# Patient Record
Sex: MALE | Race: WHITE | NOT HISPANIC OR LATINO
[De-identification: names, ages, dates, MRNs, and addresses within clinical notes are randomized per-mention and may not be internally consistent; named-entity substitution may affect disease eponyms.]

---

## 2018-05-09 ENCOUNTER — TRANSCRIPTION ENCOUNTER (OUTPATIENT)
Age: 33
End: 2018-05-09

## 2019-07-21 ENCOUNTER — TRANSCRIPTION ENCOUNTER (OUTPATIENT)
Age: 34
End: 2019-07-21

## 2020-01-30 ENCOUNTER — TRANSCRIPTION ENCOUNTER (OUTPATIENT)
Age: 35
End: 2020-01-30

## 2020-03-12 ENCOUNTER — LABORATORY RESULT (OUTPATIENT)
Age: 35
End: 2020-03-12

## 2020-03-12 ENCOUNTER — APPOINTMENT (OUTPATIENT)
Dept: OTOLARYNGOLOGY | Facility: CLINIC | Age: 35
End: 2020-03-12
Payer: COMMERCIAL

## 2020-03-12 VITALS
HEIGHT: 73 IN | SYSTOLIC BLOOD PRESSURE: 131 MMHG | WEIGHT: 210 LBS | DIASTOLIC BLOOD PRESSURE: 83 MMHG | BODY MASS INDEX: 27.83 KG/M2 | HEART RATE: 57 BPM

## 2020-03-12 PROCEDURE — 10005 FNA BX W/US GDN 1ST LES: CPT

## 2020-03-12 PROCEDURE — 99204 OFFICE O/P NEW MOD 45 MIN: CPT | Mod: 25

## 2020-03-12 RX ORDER — METHYLPREDNISOLONE 4 MG/1
4 TABLET ORAL
Qty: 21 | Refills: 0 | Status: COMPLETED | COMMUNITY
Start: 2020-01-30

## 2020-03-12 RX ORDER — AMOXICILLIN 400 MG/5ML
400 FOR SUSPENSION ORAL
Qty: 200 | Refills: 0 | Status: COMPLETED | COMMUNITY
Start: 2020-01-30

## 2020-03-12 NOTE — HISTORY OF PRESENT ILLNESS
[de-identified] : Patient referred by Dr. De La Paz for parotid  mass. ct of the neck showed 1.3 cm soft tissue right parotid mass.\par First noticed  7  months ago and slowly increase in size. no pain or discomfort, or discharge. \par Denies  dysphagia, odynophagia, dyspnea, dysphonia, nasal obstruction/bleeding, fever, weakness or weight loss.\par \par

## 2020-03-12 NOTE — PHYSICAL EXAM
[Midline] : trachea located in midline position [Normal] : no rashes [de-identified] : 1.5 cm R parotid tail mass.

## 2020-03-12 NOTE — CONSULT LETTER
[Dear  ___] : Dear  [unfilled], [Consult Letter:] : I had the pleasure of evaluating your patient, [unfilled]. [Please see my note below.] : Please see my note below. [Consult Closing:] : Thank you very much for allowing me to participate in the care of this patient.  If you have any questions, please do not hesitate to contact me. [Sincerely,] : Sincerely, [FreeTextEntry2] : Dr Leon De La Paz [FreeTextEntry3] : \par Domingo Farrell MD, FACS\par \par Otolaryngology-Head and Neck Surgery\par [employer] at [facility]\par

## 2020-07-20 DIAGNOSIS — Z01.818 ENCOUNTER FOR OTHER PREPROCEDURAL EXAMINATION: ICD-10-CM

## 2020-07-21 ENCOUNTER — OUTPATIENT (OUTPATIENT)
Dept: OUTPATIENT SERVICES | Facility: HOSPITAL | Age: 35
LOS: 1 days | End: 2020-07-21

## 2020-07-21 VITALS
HEIGHT: 72 IN | HEART RATE: 68 BPM | WEIGHT: 210.98 LBS | OXYGEN SATURATION: 98 % | DIASTOLIC BLOOD PRESSURE: 78 MMHG | RESPIRATION RATE: 16 BRPM | SYSTOLIC BLOOD PRESSURE: 122 MMHG | TEMPERATURE: 98 F

## 2020-07-21 DIAGNOSIS — Z98.890 OTHER SPECIFIED POSTPROCEDURAL STATES: Chronic | ICD-10-CM

## 2020-07-21 DIAGNOSIS — K11.8 OTHER DISEASES OF SALIVARY GLANDS: ICD-10-CM

## 2020-07-21 LAB
BLD GP AB SCN SERPL QL: NEGATIVE — SIGNIFICANT CHANGE UP
HCT VFR BLD CALC: 44.9 % — SIGNIFICANT CHANGE UP (ref 39–50)
HGB BLD-MCNC: 15.3 G/DL — SIGNIFICANT CHANGE UP (ref 13–17)
MCHC RBC-ENTMCNC: 30 PG — SIGNIFICANT CHANGE UP (ref 27–34)
MCHC RBC-ENTMCNC: 34.1 % — SIGNIFICANT CHANGE UP (ref 32–36)
MCV RBC AUTO: 88 FL — SIGNIFICANT CHANGE UP (ref 80–100)
NRBC # FLD: 0 K/UL — SIGNIFICANT CHANGE UP (ref 0–0)
PLATELET # BLD AUTO: 223 K/UL — SIGNIFICANT CHANGE UP (ref 150–400)
PMV BLD: 11.2 FL — SIGNIFICANT CHANGE UP (ref 7–13)
RBC # BLD: 5.1 M/UL — SIGNIFICANT CHANGE UP (ref 4.2–5.8)
RBC # FLD: 11.9 % — SIGNIFICANT CHANGE UP (ref 10.3–14.5)
RH IG SCN BLD-IMP: POSITIVE — SIGNIFICANT CHANGE UP
WBC # BLD: 6.51 K/UL — SIGNIFICANT CHANGE UP (ref 3.8–10.5)
WBC # FLD AUTO: 6.51 K/UL — SIGNIFICANT CHANGE UP (ref 3.8–10.5)

## 2020-07-21 RX ORDER — SODIUM CHLORIDE 9 MG/ML
1000 INJECTION, SOLUTION INTRAVENOUS
Refills: 0 | Status: DISCONTINUED | OUTPATIENT
Start: 2020-07-25 | End: 2020-08-09

## 2020-07-21 NOTE — H&P PST ADULT - NSICDXPROBLEM_GEN_ALL_CORE_FT
PROBLEM DIAGNOSES  Problem: Other diseases of salivary glands  Assessment and Plan: preop for right parotidectomy possible sternicleidomastoid on 7/25/20  preop instructions given, pt verbalized understanding   GI and chlorhexidine wash provided   pt scheduled for COVID  testing on 7/22/20

## 2020-07-21 NOTE — H&P PST ADULT - NSICDXPASTSURGICALHX_GEN_ALL_CORE_FT
PAST SURGICAL HISTORY:  History of esophageal dilatation x5    S/P reconstruction procedure 3 yrs old- reconstructive nose surgery    Status post repair of glenoid labrum right, 2008 PAST SURGICAL HISTORY:  History of esophageal dilatation x5    S/P reconstruction procedure @ 3 yrs old- reconstructive nose surgery    Status post repair of glenoid labrum right, 2008

## 2020-07-21 NOTE — H&P PST ADULT - NSANTHOSAYNRD_GEN_A_CORE
No. MAIN screening performed.  STOP BANG Legend: 0-2 = LOW Risk; 3-4 = INTERMEDIATE Risk; 5-8 = HIGH Risk

## 2020-07-21 NOTE — H&P PST ADULT - NSICDXPASTMEDICALHX_GEN_ALL_CORE_FT
PAST MEDICAL HISTORY:  Eosinophilic esophagitis     Esophageal stricture     Exercise-induced asthma     Migraine headache

## 2020-07-21 NOTE — H&P PST ADULT - RS GEN PE MLT RESP DETAILS PC
no wheezes/clear to auscultation bilaterally/respirations non-labored/no chest wall tenderness/airway patent/breath sounds equal/good air movement

## 2020-07-21 NOTE — H&P PST ADULT - HISTORY OF PRESENT ILLNESS
34 y/o male presents to PST preop for right parotidectomy possible sternocleidomastoid on 7/25/20. preop dx of other disease of salivary glands. pt reports he noticed right neck mass 1 year ago. CT scan of the neck revealed right parotid mass. pt reports mass has increased in size but does not cause pain.

## 2020-07-22 ENCOUNTER — APPOINTMENT (OUTPATIENT)
Dept: DISASTER EMERGENCY | Facility: CLINIC | Age: 35
End: 2020-07-22

## 2020-07-23 ENCOUNTER — TRANSCRIPTION ENCOUNTER (OUTPATIENT)
Age: 35
End: 2020-07-23

## 2020-07-23 LAB — SARS-COV-2 N GENE NPH QL NAA+PROBE: NOT DETECTED

## 2020-07-24 ENCOUNTER — TRANSCRIPTION ENCOUNTER (OUTPATIENT)
Age: 35
End: 2020-07-24

## 2020-07-24 NOTE — ASU PATIENT PROFILE, ADULT - HEALTHCARE INFORMATION NEEDED, PROFILE
none

## 2020-07-24 NOTE — ASU PATIENT PROFILE, ADULT - PSH
History of esophageal dilatation  x5  S/P reconstruction procedure  @ 3 yrs old- reconstructive nose surgery  Status post repair of glenoid labrum  right, 2008

## 2020-07-24 NOTE — ASU PATIENT PROFILE, ADULT - ABILITY TO HEAR (WITH HEARING AID OR HEARING APPLIANCE IF NORMALLY USED):
Rendering Text In Billing: The slide was read, and reported in an attached document. Adequate: hears normal conversation without difficulty

## 2020-07-25 ENCOUNTER — RESULT REVIEW (OUTPATIENT)
Age: 35
End: 2020-07-25

## 2020-07-25 ENCOUNTER — OUTPATIENT (OUTPATIENT)
Dept: OUTPATIENT SERVICES | Facility: HOSPITAL | Age: 35
LOS: 1 days | Discharge: HOME | End: 2020-07-25
Payer: COMMERCIAL

## 2020-07-25 ENCOUNTER — APPOINTMENT (OUTPATIENT)
Dept: OTOLARYNGOLOGY | Facility: HOSPITAL | Age: 35
End: 2020-07-25

## 2020-07-25 VITALS
DIASTOLIC BLOOD PRESSURE: 67 MMHG | TEMPERATURE: 102 F | HEART RATE: 59 BPM | OXYGEN SATURATION: 93 % | SYSTOLIC BLOOD PRESSURE: 112 MMHG | RESPIRATION RATE: 15 BRPM

## 2020-07-25 VITALS
RESPIRATION RATE: 19 BRPM | SYSTOLIC BLOOD PRESSURE: 127 MMHG | OXYGEN SATURATION: 98 % | TEMPERATURE: 98 F | HEART RATE: 50 BPM | DIASTOLIC BLOOD PRESSURE: 77 MMHG | HEIGHT: 72 IN | WEIGHT: 210.98 LBS

## 2020-07-25 DIAGNOSIS — K11.8 OTHER DISEASES OF SALIVARY GLANDS: ICD-10-CM

## 2020-07-25 DIAGNOSIS — Z98.890 OTHER SPECIFIED POSTPROCEDURAL STATES: Chronic | ICD-10-CM

## 2020-07-25 LAB — RH IG SCN BLD-IMP: POSITIVE — SIGNIFICANT CHANGE UP

## 2020-07-25 PROCEDURE — 88312 SPECIAL STAINS GROUP 1: CPT | Mod: 26

## 2020-07-25 PROCEDURE — 88307 TISSUE EXAM BY PATHOLOGIST: CPT | Mod: 26

## 2020-07-25 PROCEDURE — 15733 MUSC MYOQ/FSCQ FLP H&N PEDCL: CPT

## 2020-07-25 PROCEDURE — 88305 TISSUE EXAM BY PATHOLOGIST: CPT | Mod: 26

## 2020-07-25 PROCEDURE — 42415 EXCISE PAROTID GLAND/LESION: CPT

## 2020-07-25 RX ORDER — ONDANSETRON 8 MG/1
4 TABLET, FILM COATED ORAL ONCE
Refills: 0 | Status: COMPLETED | OUTPATIENT
Start: 2020-07-25 | End: 2020-07-25

## 2020-07-25 RX ORDER — OXYCODONE AND ACETAMINOPHEN 5; 325 MG/1; MG/1
2 TABLET ORAL EVERY 6 HOURS
Refills: 0 | Status: DISCONTINUED | OUTPATIENT
Start: 2020-07-25 | End: 2020-07-25

## 2020-07-25 RX ORDER — ASCORBIC ACID 60 MG
1 TABLET,CHEWABLE ORAL
Qty: 0 | Refills: 0 | DISCHARGE

## 2020-07-25 RX ORDER — OXYCODONE HYDROCHLORIDE 5 MG/1
10 TABLET ORAL ONCE
Refills: 0 | Status: DISCONTINUED | OUTPATIENT
Start: 2020-07-25 | End: 2020-07-25

## 2020-07-25 RX ORDER — OXYCODONE AND ACETAMINOPHEN 5; 325 MG/1; MG/1
1 TABLET ORAL
Qty: 0 | Refills: 0 | DISCHARGE
Start: 2020-07-25

## 2020-07-25 RX ORDER — OXYCODONE AND ACETAMINOPHEN 5; 325 MG/1; MG/1
1 TABLET ORAL EVERY 4 HOURS
Refills: 0 | Status: DISCONTINUED | OUTPATIENT
Start: 2020-07-25 | End: 2020-07-25

## 2020-07-25 RX ORDER — HYDROMORPHONE HYDROCHLORIDE 2 MG/ML
0.5 INJECTION INTRAMUSCULAR; INTRAVENOUS; SUBCUTANEOUS
Refills: 0 | Status: DISCONTINUED | OUTPATIENT
Start: 2020-07-25 | End: 2020-07-25

## 2020-07-25 RX ORDER — CHOLECALCIFEROL (VITAMIN D3) 125 MCG
1 CAPSULE ORAL
Qty: 0 | Refills: 0 | DISCHARGE

## 2020-07-25 RX ORDER — ACETAMINOPHEN 500 MG
650 TABLET ORAL EVERY 4 HOURS
Refills: 0 | Status: DISCONTINUED | OUTPATIENT
Start: 2020-07-25 | End: 2020-08-09

## 2020-07-25 RX ADMIN — ONDANSETRON 4 MILLIGRAM(S): 8 TABLET, FILM COATED ORAL at 23:55

## 2020-07-25 RX ADMIN — OXYCODONE HYDROCHLORIDE 10 MILLIGRAM(S): 5 TABLET ORAL at 21:36

## 2020-07-25 RX ADMIN — HYDROMORPHONE HYDROCHLORIDE 0.5 MILLIGRAM(S): 2 INJECTION INTRAMUSCULAR; INTRAVENOUS; SUBCUTANEOUS at 19:55

## 2020-07-25 RX ADMIN — SODIUM CHLORIDE 30 MILLILITER(S): 9 INJECTION, SOLUTION INTRAVENOUS at 19:53

## 2020-07-25 RX ADMIN — HYDROMORPHONE HYDROCHLORIDE 0.5 MILLIGRAM(S): 2 INJECTION INTRAMUSCULAR; INTRAVENOUS; SUBCUTANEOUS at 21:01

## 2020-07-25 RX ADMIN — HYDROMORPHONE HYDROCHLORIDE 0.5 MILLIGRAM(S): 2 INJECTION INTRAMUSCULAR; INTRAVENOUS; SUBCUTANEOUS at 19:38

## 2020-07-25 RX ADMIN — HYDROMORPHONE HYDROCHLORIDE 0.5 MILLIGRAM(S): 2 INJECTION INTRAMUSCULAR; INTRAVENOUS; SUBCUTANEOUS at 20:48

## 2020-07-25 NOTE — BRIEF OPERATIVE NOTE - OPERATION/FINDINGS
Superficial parotid gland was removed on the right side with the pleomorphic adenoma attached inferiorly. Facial nerve and branches were dissected and stimulated with strong reaction at the end of the case.

## 2020-07-25 NOTE — ASU PREOP CHECKLIST - 5.
Report from Chelly VILLALOBOS ASU Report from Chelly VILLALOBOS ASU - report back to Chelly /Melonie VILLALOBOS

## 2020-07-25 NOTE — BRIEF OPERATIVE NOTE - NSICDXBRIEFPROCEDURE_GEN_ALL_CORE_FT
PROCEDURES:  Creation, flap, sternocleidomastoid 25-Jul-2020 19:42:28  Santiago Webb  Parotidectomy, superficial 25-Jul-2020 19:42:08  Santiago Webb

## 2020-07-25 NOTE — ASU DISCHARGE PLAN (ADULT/PEDIATRIC) - CARE PROVIDER_API CALL
Domingo Farrell  OTOLARYNGOLOGY  [id number] [place] [address]  Phone: [phone]  Fax: [phone]  Follow Up Time:

## 2020-07-25 NOTE — ASU DISCHARGE PLAN (ADULT/PEDIATRIC) - ASU DC SPECIAL INSTRUCTIONSFT
General Discharge Instructions:  Please resume all regular home medications unless specifically advised not to take a particular medication. Also, please take any new medications as prescribed.  Please get plenty of rest, continue to ambulate several times per day, and drink adequate amounts of fluids. Avoid lifting weights greater than 5-10 lbs until you follow-up with your surgeon, who will instruct you further regarding activity restrictions.  Avoid driving or operating heavy machinery while taking pain medications.  Please follow-up with your surgeon and Primary Care Provider (PCP) as advised.  Incision Care:  *Please call your doctor or nurse practitioner if you have increased pain, swelling, redness, or drainage from the incision site.  *Avoid swimming and baths until your follow-up appointment.  *You may shower, and wash surgical incisions with a mild soap and warm water. Gently pat the area dry.  *If you have steri-strips, they will fall off on their own. Please remove any remaining strips 7-10 days after surgery.    Warning Signs:  Please call your doctor or nurse practitioner if you experience the following:  *New or worsening cough, shortness of breath, or wheeze.  *If you are vomiting and cannot keep down fluids or your medications.  *You are getting dehydrated due to continued vomiting, diarrhea, or other reasons. Signs of dehydration include dry mouth, rapid heartbeat, or feeling dizzy or faint when standing.  *Your pain is not improving within 8-12 hours or is not gone within 24 hours.  *You have shaking chills, or fever greater than 101.5 degrees Fahrenheit or 38 degrees Celsius.  *Any change in your symptoms, or any new symptoms that concern you.

## 2020-07-25 NOTE — ASU DISCHARGE PLAN (ADULT/PEDIATRIC) - CALL YOUR DOCTOR IF YOU HAVE ANY OF THE FOLLOWING:
Bleeding that does not stop/Inability to tolerate liquids or foods/Nausea and vomiting that does not stop/Fever greater than (need to indicate Fahrenheit or Celsius)/Pain not relieved by Medications

## 2020-07-27 PROBLEM — K22.2 ESOPHAGEAL OBSTRUCTION: Chronic | Status: ACTIVE | Noted: 2020-07-21

## 2020-07-27 PROBLEM — K20.0 EOSINOPHILIC ESOPHAGITIS: Chronic | Status: ACTIVE | Noted: 2020-07-21

## 2020-07-27 PROBLEM — J45.990 EXERCISE INDUCED BRONCHOSPASM: Chronic | Status: ACTIVE | Noted: 2020-07-21

## 2020-07-27 PROBLEM — G43.909 MIGRAINE, UNSPECIFIED, NOT INTRACTABLE, WITHOUT STATUS MIGRAINOSUS: Chronic | Status: ACTIVE | Noted: 2020-07-21

## 2020-07-29 ENCOUNTER — APPOINTMENT (OUTPATIENT)
Dept: OTOLARYNGOLOGY | Facility: CLINIC | Age: 35
End: 2020-07-29
Payer: COMMERCIAL

## 2020-07-29 PROCEDURE — 99024 POSTOP FOLLOW-UP VISIT: CPT

## 2020-07-29 NOTE — HISTORY OF PRESENT ILLNESS
[de-identified] : pt is post right parotidectomy on 7/25/2020 and here for drain removed.  pt has mild pain of the right ear and some numbness around the incision site. no fever, dyspnea, pt has hx of mild dysphagia with stricture of his esophagus.

## 2020-08-05 ENCOUNTER — TRANSCRIPTION ENCOUNTER (OUTPATIENT)
Age: 35
End: 2020-08-05

## 2020-08-14 ENCOUNTER — APPOINTMENT (OUTPATIENT)
Dept: OTOLARYNGOLOGY | Facility: CLINIC | Age: 35
End: 2020-08-14
Payer: COMMERCIAL

## 2020-08-14 VITALS
SYSTOLIC BLOOD PRESSURE: 144 MMHG | BODY MASS INDEX: 27.05 KG/M2 | HEART RATE: 80 BPM | DIASTOLIC BLOOD PRESSURE: 75 MMHG | WEIGHT: 205 LBS

## 2020-08-14 PROCEDURE — 99024 POSTOP FOLLOW-UP VISIT: CPT

## 2020-08-14 NOTE — HISTORY OF PRESENT ILLNESS
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 Healing well. Minimal pain. Some R neck numbness.

## 2020-08-14 NOTE — PHYSICAL EXAM
[de-identified] : Incision C/D/I [Midline] : trachea located in midline position [Normal] : cranial nerves 2-12 intact

## 2020-08-14 NOTE — CONSULT LETTER
[Dear  ___] : Dear  [unfilled], [Please see my note below.] : Please see my note below. [Courtesy Letter:] : I had the pleasure of seeing your patient, [unfilled], in my office today. [Consult Closing:] : Thank you very much for allowing me to participate in the care of this patient.  If you have any questions, please do not hesitate to contact me. [FreeTextEntry2] : Dr Leon De La Paz  [Sincerely,] : Sincerely, [FreeTextEntry3] : \par Domingo Farrell MD, FACS\par \par Otolaryngology-Head and Neck Surgery\par [employer] at [facility]\par

## 2021-02-26 ENCOUNTER — APPOINTMENT (OUTPATIENT)
Dept: OTOLARYNGOLOGY | Facility: CLINIC | Age: 36
End: 2021-02-26
Payer: COMMERCIAL

## 2021-02-26 VITALS — DIASTOLIC BLOOD PRESSURE: 73 MMHG | SYSTOLIC BLOOD PRESSURE: 132 MMHG | HEART RATE: 55 BPM

## 2021-02-26 PROCEDURE — 99214 OFFICE O/P EST MOD 30 MIN: CPT

## 2021-02-26 PROCEDURE — 99072 ADDL SUPL MATRL&STAF TM PHE: CPT

## 2021-02-26 RX ORDER — OXYCODONE AND ACETAMINOPHEN 5; 325 MG/1; MG/1
5-325 TABLET ORAL
Qty: 12 | Refills: 0 | Status: COMPLETED | COMMUNITY
Start: 2020-07-28 | End: 2021-02-26

## 2021-02-26 NOTE — HISTORY OF PRESENT ILLNESS
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 Pt feels R neck tightness, otherwise, he feels well.

## 2021-02-26 NOTE — CONSULT LETTER
[Dear  ___] : Dear  [unfilled], [Courtesy Letter:] : I had the pleasure of seeing your patient, [unfilled], in my office today. [Please see my note below.] : Please see my note below. [Consult Closing:] : Thank you very much for allowing me to participate in the care of this patient.  If you have any questions, please do not hesitate to contact me. [Sincerely,] : Sincerely, [FreeTextEntry2] : Dr Leon De La Paz  [FreeTextEntry3] : \par Domingo Farrell MD, FACS\par \par Otolaryngology-Head and Neck Surgery\par [employer] at [facility]\par

## 2021-02-26 NOTE — PHYSICAL EXAM
[Midline] : trachea located in midline position [Normal] : no rashes [de-identified] : Incision C/D/I

## 2021-06-19 ENCOUNTER — EMERGENCY (EMERGENCY)
Facility: HOSPITAL | Age: 36
LOS: 1 days | Discharge: HOME | End: 2021-06-19
Attending: EMERGENCY MEDICINE | Admitting: EMERGENCY MEDICINE
Payer: COMMERCIAL

## 2021-06-19 VITALS
TEMPERATURE: 98 F | OXYGEN SATURATION: 97 % | HEIGHT: 73 IN | WEIGHT: 205.03 LBS | SYSTOLIC BLOOD PRESSURE: 159 MMHG | RESPIRATION RATE: 16 BRPM | DIASTOLIC BLOOD PRESSURE: 85 MMHG | HEART RATE: 70 BPM

## 2021-06-19 DIAGNOSIS — Z98.890 OTHER SPECIFIED POSTPROCEDURAL STATES: Chronic | ICD-10-CM

## 2021-06-19 PROCEDURE — 99283 EMERGENCY DEPT VISIT LOW MDM: CPT

## 2021-06-19 PROCEDURE — 99053 MED SERV 10PM-8AM 24 HR FAC: CPT

## 2021-06-19 RX ADMIN — Medication 1 DROP(S): at 06:00

## 2021-06-19 NOTE — ED PROVIDER NOTE - CLINICAL SUMMARY MEDICAL DECISION MAKING FREE TEXT BOX
pt p/w FB like sensation in right eye , examine thoroughly and irrigated , no visible FB, no corneal abrasion, minor abrasion on left , band aid applied.

## 2021-06-19 NOTE — ED ADULT NURSE NOTE - OBJECTIVE STATEMENT
Patient BIBA with c/o R eye pain and LH laceration s/p running through a glass door while responding to Patient BIBA with c/o foreign body in R eye and LH laceration s/p running through a broken glass door.  No changes in vision.  +tearing.  UTD with tetanus.  Patient ambulatory with steady gait onto unit.  No nausea or vomiting or other complaints.

## 2021-06-19 NOTE — ED PROVIDER NOTE - NSFOLLOWUPINSTRUCTIONS_ED_ALL_ED_FT
use artifical tears in eye for soothing effect  wear dark glasses in sun  if needed follow up with eye doctor in 1-2 days   keep abrasion covered with band aid

## 2021-06-19 NOTE — ED PROVIDER NOTE - OBJECTIVE STATEMENT
36 y/o male  presents to ed c/o FB sensation in right eye as he went through broken glass, pt denies any bleeding from eye, no vision changes c/o  mild increased tearing. also c/o minor cut on left hand

## 2021-06-19 NOTE — ED PROVIDER NOTE - PATIENT PORTAL LINK FT
You can access the FollowMyHealth Patient Portal offered by [facility] by registering at the following website: [email or web address]. By joining [facility]’s FollowMyHealth portal, you will also be able to view your health information using other applications (apps) compatible with our system.

## 2021-06-19 NOTE — ED PROVIDER NOTE - PHYSICAL EXAMINATION
General:     NAD, well-nourished, well-appearing  Head:     NC/AT, EOMI, oral mucosa moist  eye : no ext injury, minimal redness on eye and chemosis of conjunctiva,, no fluorescein uptake  no visible FB   Neck:     supple  Lungs:     CTA b/l, no w/r/r  CVS:     S1S2, RRR, no m/g/r  Abd:     +BS, s/nt/nd, no organomegaly  Ext:    2+ radial and pedal pulses, no c/c/e  Neuro: grossly intact

## 2021-07-21 PROBLEM — Z78.9 OTHER SPECIFIED HEALTH STATUS: Chronic | Status: ACTIVE | Noted: 2021-06-19

## 2021-08-16 ENCOUNTER — APPOINTMENT (OUTPATIENT)
Dept: CT IMAGING | Facility: CLINIC | Age: 36
End: 2021-08-16
Payer: COMMERCIAL

## 2021-08-16 ENCOUNTER — OUTPATIENT (OUTPATIENT)
Dept: OUTPATIENT SERVICES | Facility: HOSPITAL | Age: 36
LOS: 1 days | End: 2021-08-16
Payer: COMMERCIAL

## 2021-08-16 DIAGNOSIS — Z98.890 OTHER SPECIFIED POSTPROCEDURAL STATES: Chronic | ICD-10-CM

## 2021-08-16 DIAGNOSIS — K11.8 OTHER DISEASES OF SALIVARY GLANDS: ICD-10-CM

## 2021-08-16 PROCEDURE — 70491 CT SOFT TISSUE NECK W/DYE: CPT

## 2021-08-16 PROCEDURE — 70491 CT SOFT TISSUE NECK W/DYE: CPT | Mod: 26

## 2021-08-20 ENCOUNTER — NON-APPOINTMENT (OUTPATIENT)
Age: 36
End: 2021-08-20

## 2021-08-21 ENCOUNTER — APPOINTMENT (OUTPATIENT)
Dept: ULTRASOUND IMAGING | Facility: CLINIC | Age: 36
End: 2021-08-21
Payer: COMMERCIAL

## 2021-08-21 ENCOUNTER — OUTPATIENT (OUTPATIENT)
Dept: OUTPATIENT SERVICES | Facility: HOSPITAL | Age: 36
LOS: 1 days | End: 2021-08-21
Payer: COMMERCIAL

## 2021-08-21 DIAGNOSIS — Z98.890 OTHER SPECIFIED POSTPROCEDURAL STATES: Chronic | ICD-10-CM

## 2021-08-21 DIAGNOSIS — Z00.8 ENCOUNTER FOR OTHER GENERAL EXAMINATION: ICD-10-CM

## 2021-08-21 DIAGNOSIS — E04.1 NONTOXIC SINGLE THYROID NODULE: ICD-10-CM

## 2021-08-21 DIAGNOSIS — K11.8 OTHER DISEASES OF SALIVARY GLANDS: ICD-10-CM

## 2021-08-21 PROCEDURE — 76536 US EXAM OF HEAD AND NECK: CPT | Mod: 26

## 2021-08-21 PROCEDURE — 76536 US EXAM OF HEAD AND NECK: CPT

## 2021-08-27 ENCOUNTER — APPOINTMENT (OUTPATIENT)
Dept: OTOLARYNGOLOGY | Facility: CLINIC | Age: 36
End: 2021-08-27
Payer: COMMERCIAL

## 2021-08-27 VITALS
WEIGHT: 205.56 LBS | HEIGHT: 73 IN | DIASTOLIC BLOOD PRESSURE: 72 MMHG | TEMPERATURE: 97.8 F | BODY MASS INDEX: 27.24 KG/M2 | SYSTOLIC BLOOD PRESSURE: 115 MMHG | HEART RATE: 59 BPM | RESPIRATION RATE: 16 BRPM | OXYGEN SATURATION: 97 %

## 2021-08-27 PROCEDURE — 99214 OFFICE O/P EST MOD 30 MIN: CPT

## 2021-08-27 RX ORDER — ASCORBIC ACID 500 MG
TABLET ORAL
Refills: 0 | Status: ACTIVE | COMMUNITY

## 2021-08-27 RX ORDER — MULTIVITAMIN
TABLET ORAL
Refills: 0 | Status: ACTIVE | COMMUNITY

## 2021-08-27 RX ORDER — CHROMIUM 200 MCG
TABLET ORAL
Refills: 0 | Status: ACTIVE | COMMUNITY

## 2021-08-27 NOTE — CONSULT LETTER
[Dear  ___] : Dear  [unfilled], [Courtesy Letter:] : I had the pleasure of seeing your patient, [unfilled], in my office today. [Please see my note below.] : Please see my note below. [Consult Closing:] : Thank you very much for allowing me to participate in the care of this patient.  If you have any questions, please do not hesitate to contact me. [Sincerely,] : Sincerely, [FreeTextEntry2] : Dr Leon De La Paz  [FreeTextEntry3] : \par Domingo Farrell MD, FACS\par \par Otolaryngology-Head and Neck Surgery\par [employer] at [facility]\par

## 2021-08-27 NOTE — PHYSICAL EXAM
[de-identified] : Incision well healed [Midline] : trachea located in midline position [Normal] : no rashes

## 2021-08-27 NOTE — HISTORY OF PRESENT ILLNESS
[de-identified] : 36 yro male here for 6 month follow-up. S/P R parotidectomy on 7/25/20 for a Pleomorphic adenoma. \par Today pt states he still feels R neck tightness, but it is better.  Pt with dysphagia, likely due to his eosinophilic esophagitis. No dyspnea, dysphonia, hemoptysis, fever, chills, weight loss. \par Had recent CT which showed thyroid nodule, and had subsequent US. \par \par CT 8/16/2021:\par IMPRESSION:\par 1.  Status post right superficial parotidectomy including resection of a lesion in the right superficial parotid gland.\par 2.  No enlarged cervical lymph nodes.\par 3.  A 6 mm hypodense nodule in the right thyroid lobe. Consider thyroid ultrasound if warranted.\par \par US thyroid 8/21/2021: IMPRESSION:\par Subcentimeter heterogeneous isoechoic nodule in the upper pole of the right thyroid gland (Heterogeneous isoechoic upper pole nodule measuring 0.7 x 0.5 x 0.6 cm with suggestion of punctate echogenic foci. Tiny 2 mm cyst.) recommend 6 month follow-up\par Complete review of systems which was performed during a previous encounter was reviewed with the patient and there are no changes except as stated in the HPI section.\par \par \par

## 2021-09-18 ENCOUNTER — TRANSCRIPTION ENCOUNTER (OUTPATIENT)
Age: 36
End: 2021-09-18

## 2022-02-11 ENCOUNTER — APPOINTMENT (OUTPATIENT)
Dept: ULTRASOUND IMAGING | Facility: CLINIC | Age: 37
End: 2022-02-11
Payer: COMMERCIAL

## 2022-02-11 ENCOUNTER — OUTPATIENT (OUTPATIENT)
Dept: OUTPATIENT SERVICES | Facility: HOSPITAL | Age: 37
LOS: 1 days | End: 2022-02-11
Payer: COMMERCIAL

## 2022-02-11 ENCOUNTER — APPOINTMENT (OUTPATIENT)
Dept: OTOLARYNGOLOGY | Facility: CLINIC | Age: 37
End: 2022-02-11

## 2022-02-11 DIAGNOSIS — E04.1 NONTOXIC SINGLE THYROID NODULE: ICD-10-CM

## 2022-02-11 DIAGNOSIS — Z98.890 OTHER SPECIFIED POSTPROCEDURAL STATES: Chronic | ICD-10-CM

## 2022-02-11 PROCEDURE — 76536 US EXAM OF HEAD AND NECK: CPT | Mod: 26

## 2022-02-11 PROCEDURE — 76536 US EXAM OF HEAD AND NECK: CPT

## 2022-02-15 ENCOUNTER — NON-APPOINTMENT (OUTPATIENT)
Age: 37
End: 2022-02-15

## 2022-02-24 ENCOUNTER — APPOINTMENT (OUTPATIENT)
Dept: GASTROENTEROLOGY | Facility: CLINIC | Age: 37
End: 2022-02-24
Payer: COMMERCIAL

## 2022-02-24 ENCOUNTER — NON-APPOINTMENT (OUTPATIENT)
Age: 37
End: 2022-02-24

## 2022-02-24 VITALS
HEART RATE: 65 BPM | WEIGHT: 211 LBS | DIASTOLIC BLOOD PRESSURE: 63 MMHG | SYSTOLIC BLOOD PRESSURE: 115 MMHG | TEMPERATURE: 98 F | OXYGEN SATURATION: 96 % | HEIGHT: 72 IN | BODY MASS INDEX: 28.58 KG/M2

## 2022-02-24 VITALS
OXYGEN SATURATION: 98 % | TEMPERATURE: 97.3 F | HEIGHT: 72 IN | SYSTOLIC BLOOD PRESSURE: 139 MMHG | BODY MASS INDEX: 25.06 KG/M2 | HEART RATE: 123 BPM | DIASTOLIC BLOOD PRESSURE: 92 MMHG | WEIGHT: 185 LBS

## 2022-02-24 DIAGNOSIS — K20.0 EOSINOPHILIC ESOPHAGITIS: ICD-10-CM

## 2022-02-24 DIAGNOSIS — K22.2 ESOPHAGEAL OBSTRUCTION: ICD-10-CM

## 2022-02-24 DIAGNOSIS — D72.10 EOSINOPHILIA, UNSPECIFIED: ICD-10-CM

## 2022-02-24 DIAGNOSIS — R13.10 DYSPHAGIA, UNSPECIFIED: ICD-10-CM

## 2022-02-24 PROCEDURE — 99204 OFFICE O/P NEW MOD 45 MIN: CPT

## 2022-02-24 NOTE — ASSESSMENT
[FreeTextEntry1] : . I did discuss with the patient diet and exercise including a low acid diet. I recommended a probiotic. I do recommend that he continue his current treatment for eosinophilic esophagitis. I will plan for upper endoscopy with likely dilation of the esophageal narrowing.\par \par A low acid / reflux diet was discussed in great detail including  not smoking, not drinking alcohol, and not consuming foods that irritate the esophagus. It is helpful to eat small meals throughout the day instead of large meals. You should avoid eating before bedtime or lying down after you eat. It can be helpful to raise the head of your bed six inches. Additionally, you should maintain a healthy weight and good posture.. The patient was given written material to take home and review.\par

## 2022-02-24 NOTE — HISTORY OF PRESENT ILLNESS
[de-identified] : . The patient is a 31-year-old healthy male presented for followup.  He has a history of eosinophilic esophagitis with a moderate-sized distal esophageal stricture. I last dilated his stricture approximately 5 years ago. He has been doing well. He does notice that over the last few months if he does not drink enough fluids food tends to stay in the esophagus for prolonged periods of time. He has no episodes of  impaction. He has no episodes of hypersalivation. There is no nausea vomiting fevers chills or bleeding

## 2022-03-01 ENCOUNTER — APPOINTMENT (OUTPATIENT)
Dept: OTOLARYNGOLOGY | Facility: CLINIC | Age: 37
End: 2022-03-01
Payer: COMMERCIAL

## 2022-03-01 DIAGNOSIS — Z78.9 OTHER SPECIFIED HEALTH STATUS: ICD-10-CM

## 2022-03-01 PROCEDURE — 99214 OFFICE O/P EST MOD 30 MIN: CPT

## 2022-03-01 NOTE — HISTORY OF PRESENT ILLNESS
[de-identified] : 37 yro male here for 6 month follow-up. S/P R parotidectomy on 7/25/20 for a Pleomorphic adenoma. Pt also with thyroid nodule. Recent US thyroid 2/11/2022 was stable. \par Today pt with no new symptoms. States he still feels R neck tightness, but it is better.  Pt with dysphagia, likely due to his eosinophilic esophagitis. Has been started on Omeprazole 40mg by GI Dr. Brunner. Going for dilation in April 2022.  No dyspnea, dysphonia, hemoptysis, fever, chills, weight loss. \par Complete review of systems which was performed during a previous encounter was reviewed with the patient and there are no changes except as stated in the HPI section.\par \par \par US thyroid (NW) 2/11/2022: IMPRESSION:\par Moderately Suspicious right thyroid nodule, not significantly changed (0.8 x 0.6 x 0.6 cm heterogeneous predominantly isoechoic nodule in the midpole with irregular borders. No calcifications are identified.) \par Consider further evaluation with ultrasound-guided fine-needle aspiration.\par

## 2022-03-01 NOTE — REASON FOR VISIT
[Subsequent Evaluation] : a subsequent evaluation for [FreeTextEntry2] : parotid nodule and thyroid nodule

## 2022-03-01 NOTE — PHYSICAL EXAM
[de-identified] : Incision well healed. ARLETTE. [Midline] : trachea located in midline position [Normal] : no rashes

## 2022-03-01 NOTE — CONSULT LETTER
[Dear  ___] : Dear  [unfilled], [Courtesy Letter:] : I had the pleasure of seeing your patient, [unfilled], in my office today. [Please see my note below.] : Please see my note below. [Consult Closing:] : Thank you very much for allowing me to participate in the care of this patient.  If you have any questions, please do not hesitate to contact me. [Sincerely,] : Sincerely, [FreeTextEntry2] : Dr Leon De La Paz  [FreeTextEntry3] : \par Domingo Farrell MD, FACS\par \par Otolaryngology-Head and Neck Surgery\par [employer] at [facility]\par

## 2022-04-11 ENCOUNTER — APPOINTMENT (OUTPATIENT)
Dept: GASTROENTEROLOGY | Facility: AMBULATORY MEDICAL SERVICES | Age: 37
End: 2022-04-11
Payer: COMMERCIAL

## 2022-04-11 LAB — SARS-COV-2 N GENE NPH QL NAA+PROBE: NOT DETECTED

## 2022-04-11 PROCEDURE — 43239 EGD BIOPSY SINGLE/MULTIPLE: CPT

## 2022-09-24 ENCOUNTER — EMERGENCY (EMERGENCY)
Facility: HOSPITAL | Age: 37
LOS: 0 days | Discharge: HOME | End: 2022-09-25
Attending: EMERGENCY MEDICINE
Payer: COMMERCIAL

## 2022-09-24 VITALS — WEIGHT: 190.04 LBS | HEIGHT: 73 IN

## 2022-09-24 DIAGNOSIS — Z23 ENCOUNTER FOR IMMUNIZATION: ICD-10-CM

## 2022-09-24 DIAGNOSIS — S01.111A LACERATION WITHOUT FOREIGN BODY OF RIGHT EYELID AND PERIOCULAR AREA, INITIAL ENCOUNTER: ICD-10-CM

## 2022-09-24 DIAGNOSIS — R40.0 SOMNOLENCE: ICD-10-CM

## 2022-09-24 DIAGNOSIS — Z98.890 OTHER SPECIFIED POSTPROCEDURAL STATES: Chronic | ICD-10-CM

## 2022-09-24 DIAGNOSIS — F10.129 ALCOHOL ABUSE WITH INTOXICATION, UNSPECIFIED: ICD-10-CM
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
## 2022-09-24 DIAGNOSIS — S09.90XA UNSPECIFIED INJURY OF HEAD, INITIAL ENCOUNTER: ICD-10-CM
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
## 2022-09-24 DIAGNOSIS — G43.009 MIGRAINE WITHOUT AURA, NOT INTRACTABLE, WITHOUT STATUS MIGRAINOSUS: ICD-10-CM

## 2022-09-24 DIAGNOSIS — S01.511A LACERATION WITHOUT FOREIGN BODY OF LIP, INITIAL ENCOUNTER: ICD-10-CM

## 2022-09-24 DIAGNOSIS — J45.909 UNSPECIFIED ASTHMA, UNCOMPLICATED: ICD-10-CM

## 2022-09-24 DIAGNOSIS — S02.2XXA FRACTURE OF NASAL BONES, INITIAL ENCOUNTER FOR CLOSED FRACTURE: ICD-10-CM

## 2022-09-24 DIAGNOSIS — K20.0 EOSINOPHILIC ESOPHAGITIS: ICD-10-CM

## 2022-09-24 DIAGNOSIS — Z88.2 ALLERGY STATUS TO SULFONAMIDES: ICD-10-CM

## 2022-09-24 LAB
ALBUMIN SERPL ELPH-MCNC: 4.1 G/DL — SIGNIFICANT CHANGE UP (ref 3.3–5)
ALP SERPL-CCNC: 37 U/L — LOW (ref 40–120)
ALT FLD-CCNC: 84 U/L — HIGH (ref 12–78)
ANION GAP SERPL CALC-SCNC: 7 MMOL/L — SIGNIFICANT CHANGE UP (ref 5–17)
AST SERPL-CCNC: 56 U/L — HIGH (ref 15–37)
BILIRUB SERPL-MCNC: 0.4 MG/DL — SIGNIFICANT CHANGE UP (ref 0.2–1.2)
BUN SERPL-MCNC: 11 MG/DL — SIGNIFICANT CHANGE UP (ref 7–23)
CALCIUM SERPL-MCNC: 8.6 MG/DL — SIGNIFICANT CHANGE UP (ref 8.5–10.1)
CHLORIDE SERPL-SCNC: 107 MMOL/L — SIGNIFICANT CHANGE UP (ref 96–108)
CO2 SERPL-SCNC: 23 MMOL/L — SIGNIFICANT CHANGE UP (ref 22–31)
CREAT SERPL-MCNC: 1.39 MG/DL — HIGH (ref 0.5–1.3)
EGFR: 67 ML/MIN/1.73M2 — SIGNIFICANT CHANGE UP
GLUCOSE SERPL-MCNC: 150 MG/DL — HIGH (ref 70–99)
HCT VFR BLD CALC: 45.6 % — SIGNIFICANT CHANGE UP (ref 39–50)
HGB BLD-MCNC: 15.8 G/DL — SIGNIFICANT CHANGE UP (ref 13–17)
MCHC RBC-ENTMCNC: 30.6 PG — SIGNIFICANT CHANGE UP (ref 27–34)
MCHC RBC-ENTMCNC: 34.6 GM/DL — SIGNIFICANT CHANGE UP (ref 32–36)
MCV RBC AUTO: 88.4 FL — SIGNIFICANT CHANGE UP (ref 80–100)
PLATELET # BLD AUTO: 266 K/UL — SIGNIFICANT CHANGE UP (ref 150–400)
POTASSIUM SERPL-MCNC: 3.3 MMOL/L — LOW (ref 3.5–5.3)
POTASSIUM SERPL-SCNC: 3.3 MMOL/L — LOW (ref 3.5–5.3)
PROT SERPL-MCNC: 7.6 GM/DL — SIGNIFICANT CHANGE UP (ref 6–8.3)
RBC # BLD: 5.16 M/UL — SIGNIFICANT CHANGE UP (ref 4.2–5.8)
RBC # FLD: 11.9 % — SIGNIFICANT CHANGE UP (ref 10.3–14.5)
SODIUM SERPL-SCNC: 137 MMOL/L — SIGNIFICANT CHANGE UP (ref 135–145)
WBC # BLD: 13.52 K/UL — HIGH (ref 3.8–10.5)
WBC # FLD AUTO: 13.52 K/UL — HIGH (ref 3.8–10.5)

## 2022-09-24 PROCEDURE — 12011 RPR F/E/E/N/L/M 2.5 CM/<: CPT

## 2022-09-24 PROCEDURE — 70486 CT MAXILLOFACIAL W/O DYE: CPT | Mod: 26,MA

## 2022-09-24 PROCEDURE — 72125 CT NECK SPINE W/O DYE: CPT | Mod: MA

## 2022-09-24 PROCEDURE — 70486 CT MAXILLOFACIAL W/O DYE: CPT | Mod: MA

## 2022-09-24 PROCEDURE — 87635 SARS-COV-2 COVID-19 AMP PRB: CPT

## 2022-09-24 PROCEDURE — 70450 CT HEAD/BRAIN W/O DYE: CPT | Mod: 26,MA

## 2022-09-24 PROCEDURE — 72125 CT NECK SPINE W/O DYE: CPT | Mod: 26,MA

## 2022-09-24 PROCEDURE — 36415 COLL VENOUS BLD VENIPUNCTURE: CPT

## 2022-09-24 PROCEDURE — 90715 TDAP VACCINE 7 YRS/> IM: CPT

## 2022-09-24 PROCEDURE — 76376 3D RENDER W/INTRP POSTPROCES: CPT

## 2022-09-24 PROCEDURE — 99285 EMERGENCY DEPT VISIT HI MDM: CPT | Mod: 25

## 2022-09-24 PROCEDURE — 82962 GLUCOSE BLOOD TEST: CPT

## 2022-09-24 PROCEDURE — 76376 3D RENDER W/INTRP POSTPROCES: CPT | Mod: 26

## 2022-09-24 PROCEDURE — 80053 COMPREHEN METABOLIC PANEL: CPT

## 2022-09-24 PROCEDURE — 85025 COMPLETE CBC W/AUTO DIFF WBC: CPT

## 2022-09-24 PROCEDURE — 70450 CT HEAD/BRAIN W/O DYE: CPT | Mod: MA

## 2022-09-24 PROCEDURE — 96374 THER/PROPH/DIAG INJ IV PUSH: CPT | Mod: XU

## 2022-09-24 PROCEDURE — 13152 CMPLX RPR E/N/E/L 2.6-7.5 CM: CPT

## 2022-09-24 RX ORDER — TETANUS TOXOID, REDUCED DIPHTHERIA TOXOID AND ACELLULAR PERTUSSIS VACCINE, ADSORBED 5; 2.5; 8; 8; 2.5 [IU]/.5ML; [IU]/.5ML; UG/.5ML; UG/.5ML; UG/.5ML
0.5 SUSPENSION INTRAMUSCULAR ONCE
Refills: 0 | Status: COMPLETED | OUTPATIENT
Start: 2022-09-24 | End: 2022-09-24

## 2022-09-24 RX ORDER — ONDANSETRON 8 MG/1
4 TABLET, FILM COATED ORAL ONCE
Refills: 0 | Status: COMPLETED | OUTPATIENT
Start: 2022-09-24 | End: 2022-09-24

## 2022-09-24 RX ORDER — SODIUM CHLORIDE 9 MG/ML
1000 INJECTION INTRAMUSCULAR; INTRAVENOUS; SUBCUTANEOUS ONCE
Refills: 0 | Status: COMPLETED | OUTPATIENT
Start: 2022-09-24 | End: 2022-09-24

## 2022-09-24 RX ADMIN — SODIUM CHLORIDE 2000 MILLILITER(S): 9 INJECTION INTRAMUSCULAR; INTRAVENOUS; SUBCUTANEOUS at 23:52

## 2022-09-24 RX ADMIN — ONDANSETRON 4 MILLIGRAM(S): 8 TABLET, FILM COATED ORAL at 23:52

## 2022-09-24 NOTE — ED PROVIDER NOTE - NSICDXPASTSURGICALHX_GEN_ALL_CORE_FT
PAST SURGICAL HISTORY:  History of esophageal dilatation x5    S/P reconstruction procedure @ 3 yrs old- reconstructive nose surgery    Status post repair of glenoid labrum right, 2008

## 2022-09-24 NOTE — ED PROVIDER NOTE - ENMT, MLM
Airway patent, Nasal mucosa clear. Mouth with normal mucosa. Throat has no vesicles, no oropharyngeal exudates and uvula is midline.  3 cm through and through laceration of the right upper lip through the vermilion border

## 2022-09-24 NOTE — ED PROVIDER NOTE - CARE PLAN
Principal Discharge DX:	Nasal bone fracture  Secondary Diagnosis:	Lip laceration  Secondary Diagnosis:	Closed head injury  Secondary Diagnosis:	Acute alcohol intoxication   1

## 2022-09-24 NOTE — ED ADULT TRIAGE NOTE - BANDS:
[facility] Hospitalist Discharge Summary     Patient Info: Date: 22    Name: Thomas Carlson Status:  Observation   : 1969 MRN: [id number]   Hosp Day: 0 PCP: Jhonathan Munson MD     Code Status: Full Resuscitation  Lab Results   Component Value Date    [id number] Not Detected 2022            Admission Dt/Tm   2022  3:13 PM    Discharge DT/TM  2022    Thomas Carlson  is a 52 year old female admitted for   Chief Complaint   Patient presents with   • Abdominal Pain     #    DISCHARGE MEDICATIONS:     Summary of your Discharge Medications      Take these Medications      Details   acetaminophen 500 MG tablet  Commonly known as: TYLENOL   Take 500 mg by mouth every 6 hours as needed for Pain.     amitriptyline 10 MG tablet  Commonly known as: ELAVIL   Take 3 tablets by mouth nightly.     dicyclomine 10 MG capsule  Commonly known as: BENTYL      linaclotide 145 MCG capsule  Commonly known as: LINZESS   Take 1 capsule by mouth daily (before breakfast).     pantoprazole 40 MG tablet  Commonly known as: PROTONIX   Take 1 tablet by mouth daily (before breakfast).     topiramate 25 MG tablet  Commonly known as: TOPAMAX   Take 25 mg by mouth.     Trulance 3 MG tablet   Generic drug: plecanatide              REASON FOR ADMISSION:  #52 year old female with past medical history significant for IBS who presented to the emergency department today due to complaints of abdominal pain.  Patient states the pain is been ongoing intermittently for the last 2 weeks.    She had persistent right lower quadrant pain.  She evaluated by general surgery and she does have a known history of IBS-C.  CT showed a large stool burden and there is a fecalith the tip of the appendix but no inflammatory changes no concern for acute appendicitis per general surgery.       She was also eval by GI given persistent pain she was given a small tapwater enema but still had persistent pain pending hopefully bowel movement this  morning and if improves will discharge or convert to inpatient.       DISCHARGE DIAGNOSIS:  #RLQ abdominal pain  -Similar episodes in the past related to IBS  Ongoing for last two weeks with acute worsening since yesterday  Labs unremarkable  CT abdomen showing constipation, enteritis, esophagitis, appendicolith with small focal area of wall thickening, US pelvis without acute findings  -Evaluated by general surgery team low suspicion for acute appendicitis cleared for discharge home but has persistent pain  -Nausea vomiting resolved   -On clear liquid diet  -Tapwater enema given  -Hopefully if she has a bowel movements abdominal pain will resolve     Appreciate GI input     IBS  Used to follow up as an outpatient with GI and was started on medication to help with her IBS sx but states she developed a rash/eczema so discontinued the med  Will follow with GI outpatient as well     Migraine headache  Continue pain control       GI-->Dr. Bautista  51yo F with hx of hysterectomy, cholecystectomy, & IBS-C. Previously on Trulance, but developed a rash from it. She presented with RLQ abd pain and emesis.      1. IBS-C  2. Nonspecific thickening of distal esophagus  3. Nonspecific enteritis in LLQ  - CT AP W CON 7/30 - Enteritis suspected. Constipation. Appendicolith with small focal region of wall thickening. Esophagitis suspected.  - US Pelvis 7/30 - Surgically absent uterus. Subcentimeter simple ovarian cysts.      - Uses bentyl occasionally  - Tried miralax, trulance (allergic rash), and several OTC meds including mag citrate in the past  - Uses bentyl prn at home for abd cramping     - WBC 4.8  - Hgb 12.5  - Plt 148  - Lytes OK  - LFTs and lipase unremarkable     - Small BM s/p tap water enema     - DDx for the small bowel enteritis includes infectious vs IBD (doubt given acute symptoms) vs low grade ischemia which is unlikely given lack of cardiac hx or recent hypotension.She would like to try an alternative to  Trulance which she has now been off of x3 months.  She is agreeable to trying linzess. We will need to monitor her closely on this.   - Dulcolax 10mg IA daily prn  - MOM daily prn  - Low residue diet  - She will need close outpt monitoring in 2-3 weeks. If symptoms fail to resolve, would recommend repeat colonoscopy.     4. GERD / Post-prandial fullness  5. Hx h. Pylori  - CT showing esophagitis  - Hx of schatzski ring??   - last EGD 2014     - H. Pylori stool antigen  - Pantoprazole 40mg PO daily  - If symptoms persist and h. P. Negative, would recommend outpt EGD. She is agreeable to this plan.                  PMH:  History reviewed. No pertinent past medical history.     PSH:  Past Surgical History:   Procedure Laterality Date   • Breast enhancement surgery     • Cholecystectomy     • Clavicle surgery Left     orif   • Hysterectomy          SOCIAL HX:  Thomas denies any illicit drug use except for as noted below.  Social History     Tobacco Use   • Smoking status: Never Smoker   • Smokeless tobacco: Never Used   Vaping Use   • Vaping Use: never used   Substance Use Topics   • Alcohol use: Yes     Alcohol/week: 1.0 standard drink     Types: 1 Glasses of wine per week     Comment: socially   • Drug use: Never        FAMILY-HX:  Family History   Problem Relation Age of Onset   • Cancer, Colon Other    • Diabetes Other      Patient is not aware of any other chronic conditions in mother father or direct siblings     ALLERGY:  ALLERGIES:  Compazine and Amoxicillin-pot clavulanate  Patient is not aware of any other allergies.       VITALS:     Vital Last Value 24 Hour Range   Temperature 97.7 °F (36.5 °C) (08/02/22 0723) Temp  Min: 97.7 °F (36.5 °C)  Max: 98.2 °F (36.8 °C)   Pulse (!) 52 (08/02/22 0723) Pulse  Min: 50  Max: 63   Respiratory 18 (08/02/22 0352) Resp  Min: 18  Max: 18   Blood Pressure 118/73 (08/02/22 0723) BP  Min: 104/64  Max: 118/73   Pulse Oximetry 98 % (08/02/22 0723) SpO2  Min: 97 %  Max: 99 %       Today Admitted   Weight 63.1 kg (139 lb 1.8 oz) (07/30/22 1129) Weight: 63.1 kg (139 lb 1.8 oz) (07/30/22 1129)   Height N/A Height: 5' 6\" (167.6 cm) (07/30/22 1129)   BMI N/A BMI (Calculated): 22.45 (07/30/22 1129)         Recent Labs   Lab 08/01/22  0526 07/30/22  1144   WBC 4.8 8.2   RBC 3.92* 4.76   HGB 12.5 14.9   HCT 35.7* 42.8    239   MCV 91.1 89.9   MCH 31.9 31.3   MCHC 35.0 34.8   NRBC 0 0          No results for input(s): INR, PT, PTT in the last 8765 hours.       Recent Labs   Lab 07/30/22 2000   Rapid SARS-COV-2 by PCR Not Detected        Recent Labs   Lab 08/01/22  0526 07/30/22  1144   Sodium 141 138   Potassium 3.7 4.2   Chloride 110 106   Carbon Dioxide 25 25   Anion Gap 10 11   Glucose 90 103*   BUN 8 13   Creatinine 0.77 0.71   BUN/ Creatinine Ratio 10 18   Glomerular Filtration Rate >90 >90   Calcium 8.3* 9.5   Bilirubin, Total  --  0.9   GOT/AST  --  15   GPT  --  19   Alkaline Phosphatase  --  72   Globulin  --  3.1   A/G Ratio  --  1.4       No results available in last 24 hours      No results for input(s): HGBA1C, CHOLESTEROL, CALCLDL, HDL, TRIGLYCERIDE, TSH in the last 8765 hours.     No results found     ABG  No results found        Urine Panel  Lab Results   Component Value Date    UKET Negative 07/30/2022    USPG 1.014 07/30/2022    UPROT Negative 07/30/2022    UWBC Negative 07/30/2022    URBC Small (A) 07/30/2022    UBILI Negative 07/30/2022    UPH 7.0 07/30/2022       Pathology Report  No results found for: PATHREPORT       RADIOLOGY  CT ABDOMEN PELVIS W CONTRAST - IV contrast only    Result Date: 7/30/2022  HISTORY:RLQ abdominal pain, appendicitis suspected (Age >= 14y). 5' 6\" ft 139.11 lb TECHNIQUE: CT abdomen and pelvis protocol with sagittal and coronal reformations, using 100 cc of Omnipaque 300 IV contrast.  Oral contrast was not administered.  Comparison: None. FINDINGS: Chest: The lung bases are normal. Abdomen: liver is normal. The portal veins enhance normally. The  hepatic veins are normal. There is mild intrahepatic central ductal dilatation.  Postcholecystectomy. The spleen is normal. The pancreas  is normal. The adrenal glands are normal. The right kidney is normal. The left kidney is normal.  Bowel is unopacified and limited. Distal esophagus is mildly thickened may represent esophagitis or reflux disease, recommend further evaluation clinically. The stomach is partially decompressed but otherwise grossly normal. The duodenum to the level of the second/third segment is normal. The distal duodenum is grossly normal. Small bowel images mild distention but without significant wall thickening or valvular thickening. No significant mucosal enhancement. No perienteric stranding. However the distal small bowel within the lower abdomen, left lower quadrant and extending into the pelvis does demonstrate wall thickening and mild enhancement concerning for enteritis. The appendix is demonstrated projecting medially and inferiorly from the cecum and demonstrates an appendicolith and very mild wall thickening at the site of the appendicolith. The distal and proximal segments of the appendix are otherwise grossly unremarkable. Early changes of appendicitis cannot be excluded. The cecum and the colon demonstrates moderate amount of stool throughout the colon consistent with constipation. No clear evidence of wall thickening or pneumatosis. There is no free air evident. There is no free fluid evident. The aorta is normal. The IVC is normal. No abdominal or retroperitoneal adenopathy is seen. Pelvis:  Within the pelvis the urinary bladder is normal. Post hysterectomy. Vaginal cuff normal. Limited evaluation of the adnexal regions. No pelvic free air is noted. There is no pelvic free fluid. No pelvic or inguinal lymphadenopathy is seen.  Rotoscoliotic curvature of the lumbar spine and lower thoracic spine convex left.      Enteritis suspected. Constipation.  Appendicolith with very small  focal region of wall thickening adjacent to the appendicolith but with majority of the appendix is otherwise normal.  Esophagitis suspected. Additional incidental findings. Consider follow-up examination to include oral contrast if symptoms persist.   Electronically Signed by: NEAL SALMON MD Signed on: 7/30/2022 5:50 PM     US PELVIS COMPLETE NON OB TRANSVAGINAL    Result Date: 7/30/2022  EXAM: US PELVIS NON-OB TRANSVAGINAL CLINICAL INDICATION: Right lower quadrant pain COMPARISON: Recent CT abdomen pelvis. FINDINGS: Uterus is surgically absent. Right ovary measures 1.8 x 0.9 x 0.9 cm with a simple appearing 0.6 x 0.8 x 0.5 cm cyst and preserved arterial color Doppler flow signal. Left ovary measures 2.9 x 1.2 x 1.1 cm with a simple appearing 0.8 x 0.8 x 0.7 cm cyst and preserved arterial color Doppler flow signal.     1.   Surgically absent uterus. 2.   Subcentimeter simple appearing ovarian cysts. Electronically Signed by: JIM LINDA MD Signed on: 7/30/2022 8:11 PM         US PELVIS COMPLETE NON OB TRANSVAGINAL   Final Result by Jim Linda MD (07/30 2011)      1.   Surgically absent uterus.      2.   Subcentimeter simple appearing ovarian cysts.      Electronically Signed by: JIM LINDA MD    Signed on: 7/30/2022 8:11 PM          CT ABDOMEN PELVIS W CONTRAST - IV contrast only   Final Result by Neal Salmon MD (07/30 1750)    Enteritis suspected. Constipation.        Appendicolith with very small focal region of wall thickening adjacent to   the appendicolith but with majority of the appendix is otherwise normal.        Esophagitis suspected.       Additional incidental findings.       Consider follow-up examination to include oral contrast if symptoms   persist.                    Electronically Signed by: NEAL SALMON MD    Signed on: 7/30/2022 5:50 PM              Imaging:  MRI BREAST DIAGNOSTIC BILATERAL W WO CONTRAST  Narrative: CLINICAL HISTORY: History of breast implants, dense breasts on  mammogram, family history of breast cancer. Palpable masses in the right breast at the 6:00 and 9:00 position on physical exam., Probably benign masses (such as fibroadenomas or intramammary lymph nodes) at the 12:00 and 6:00 positions on mammographic and sonographic evaluation of the right breast.    COMPARISON: Diagnostic mammogram and ultrasound 10/8/2019, prior screening mammograms, diagnostic mammogram and ultrasound 10/4/2017, diagnostic mammogram and ultrasound 7/6/2016, prior MRI breast and follow-up ultrasound from 2012    TECHNIQUE:    An MRI of the breasts without and with gadolinium was performed using a 1.5 solo magnet. Time/activity curves were also obtained. 15 mL of Magnevist was administered intravenously for postcontrast imaging. [employer]okha[employer] was used in the interpretation of this study.    FINDINGS:    The breasts have and extremely dense parenchymal pattern with mild background enhancement.    Bilateral subpectoral saline implants are noted. The implants demonstrate multiple normal radial folds. There is no evidence of rupture.    RIGHT BREAST: There are no suspicious masses.     There is a focus of T2 hyperintensity in the right subareolar region measuring approximately 3 mm (series #5 image 23). Additional subcentimeter focus of T2 hyperintensity in the medial aspect of the right breast (series #5 image 23). These do not demonstrate evidence of enhancement on postcontrast imaging and are consistent with benign lesions, such as fibroadenomas or intramammary lymph nodes..    No areas of nonmass enhancement No skin thickening or nipple inversion.     No axillary lymphadenopathy.    LEFT BREAST:      No suspicious masses in the left breast.    In the subareolar region of the left breast, there is an ovoid, T2 hyperintense lesion which measures 0.5 x 1.0 cm (series #5 image 27). This does not enhance postcontrast imaging. Findings are favored to represent a benign intramammary lymph node.    In the  upper, slightly outer aspect of the left breast at approximately the 1 to 2:00 position, there is an enhancing lesion which measures 1.1 x 0.6 x 0.8 cm. This lesion demonstrates intrinsic T2 hyperintensity (series #5 image 34). There appears to be a central fatty hilum, and this likely represents a benign intramammary lymph node. Was also appear stable dating back to MRI from 2012.    There are scattered benign appearing left axillary lymph nodes. No areas of nonmass enhancement.    There is no evidence of skin thickening or nipple inversion.   Impression: 1. Grossly unremarkable MRI of the breasts with no evidence of malignancy.    2. Bilateral saline implants appear intact.    BI-RADS 2, BENIGN FINDING(S)     I was physically present throughout the procedure and/or personally have reviewed the image/findings as well as the residents interpretation and agree with the above findings.    d: Nov 25 2019 03:24P  t: Nov 26 2019 10:58A by   f: Nov 26 2019 02:31P        Cardiac:   ECG:   Encounter Date: 07/30/22   Electrocardiogram 12-Lead   Result Value    Ventricular Rate EKG/Min (BPM) 63    Atrial Rate (BPM) 63    DE-Interval (MSEC) 102    QRS-Interval (MSEC) 76    QT-Interval (MSEC) 406    QTc 416    P Axis (Degrees) -16    R Axis (Degrees) 78    T Axis (Degrees) 71    REPORT TEXT      Sinus rhythm  with short DE  Otherwise normal ECG  No previous ECGs available  Confirmed by KASSANDRA CESAR MD ([id number]) on 7/30/2022 12:24:01 PM          Echocardiogram:  No results found for this or any previous visit.       Stress test:  No results found for this or any previous visit.      Cath Report:  No results found for this or any previous visit.     Cultures:  Microbiology Results     None           Test Results Pending           PERTINENT DISCHARGE EXAM     General:  AO3, No acute distress.    Eye:  Pupils are equal, round and reactive to light,  Normal conjunctiva,     HENT:  Normocephalic.    Neck:  Supple, Non-tender.     Resp:Lungs are clear to auscultation.   CVS:  Normal rate, Regular rhythm, No murmur, No gallop.    Gastro: Soft, Non-tender, Normal bowel sounds.    Derm:  Warm.    Neurologic: Awake moving all ext.    Psychiatric:  Cooperative    DISCHARGE INSTRUCTIONS     Discharge Status: improved.     Discharge instructions given: to patient, to family member, to caregiver.     Discharge disposition: discharge to -->Home  Prescriptions: continue  medications as listed above  Education and Follow-up :Counseled: patient, family, regarding diagnosis and Treatment    CONSULTS:  IP Consult Orders (From admission, onward)             Start     Ordered    07/30/22 2355  Inpatient consult to GI  ONE TIME        Provider:  Kyleigh Bautista MD    07/30/22 2354 07/30/22 1808  Inpatient consult to General Surgery  ONE TIME        Comments: Resident notified   Provider:  Malvin Winston MD    07/30/22 1808                  Follow up: in 1-2 week     1. PCP: Jhonathan Munson MD   informed  by Message Routing system of [employer]. Dr. ANNABELLE Blevins     Final diagnosis, treatment plan, and follow-up recommendations were discussed and explained to the patient. The patient was given an opportunity to ask questions concerning the diagnosis and treatment plan. The patient acknowledged understanding of the diagnosis, treatment, and follow-up recommendations. The patient was advised to seek urgent care upon discharge if worsening symptoms develop prior to scheduled follow-up.   Total time spent on discharge process was more than 35 minutes.    Cody Maradiaga MD  [facility] Hospitalist  8/2/2022   Fall Risk;

## 2022-09-24 NOTE — ED PROVIDER NOTE - NSFOLLOWUPINSTRUCTIONS_ED_ALL_ED_FT
Laceration    A laceration is a cut that goes through all of the layers of the skin and into the tissue that is right under the skin. Some lacerations heal on their own. Others need to be closed with skin adhesive strips, skin glue, stitches (sutures), or staples. Proper laceration care minimizes the risk of infection and helps the laceration to heal better.  If non-absorbable stitches or staples have been placed, they must be taken out within the time frame instructed by your healthcare provider.    SEEK IMMEDIATE MEDICAL CARE IF YOU HAVE ANY OF THE FOLLOWING SYMPTOMS: swelling around the wound, worsening pain, drainage from the wound, red streaking going away from your wound, inability to move finger or toe near the laceration, or discoloration of skin near the laceration.    Concussion    WHAT YOU NEED TO KNOW:    A concussion is a mild brain injury. It is usually caused by a bump or blow to the head from a fall, a motor vehicle crash, or a sports injury. Sometimes being shaken forcefully may cause a concussion.    DISCHARGE INSTRUCTIONS:    Have someone call 911 for any of the following:     Someone tries to wake you and cannot do so.      You have a seizure, increasing confusion, or a change in personality.      Your speech becomes slurred, or you have new vision problems.    Return to the emergency department if:     You have sudden and new vision problems.      You have a severe headache that does not go away.      You have arm or leg weakness, numbness, or new problems with coordination.      You have blood or clear fluid coming out of the ears or nose.    Contact your healthcare provider if:     You have nausea or are vomiting.      You feel more sleepy than usual.      Your symptoms get worse.      Your symptoms last longer than 6 weeks after the injury.      You have questions or concerns about your condition or care.    Medicines: You may need any of the following:     Acetaminophen decreases pain and fever. It is available without a doctor's order. Ask how much to take and how often to take it. Follow directions. Read the labels of all other medicines you are using to see if they also contain acetaminophen, or ask your doctor or pharmacist. Acetaminophen can cause liver damage if not taken correctly. Do not use more than 4 grams (4,000 milligrams) total of acetaminophen in one day.       NSAIDs help decrease swelling and pain or fever. This medicine is available with or without a doctor's order. NSAIDs can cause stomach bleeding or kidney problems in certain people. If you take blood thinner medicine, always ask your healthcare provider if NSAIDs are safe for you. Always read the medicine label and follow directions.      Take your medicine as directed. Contact your healthcare provider if you think your medicine is not helping or if you have side effects. Tell him or her if you are allergic to any medicine. Keep a list of the medicines, vitamins, and herbs you take. Include the amounts, and when and why you take them. Bring the list or the pill bottles to follow-up visits. Carry your medicine list with you in case of an emergency.    Self-care: Concussion symptoms usually go away within about 10 days, but they may last longer. The following may be recommended to manage your symptoms:     Rest from physical and mental activities as directed. Mental activities are those that require thinking, concentration, and attention. You will need to rest until your symptoms are gone. Rest will allow you to recover from your concussion. Ask your healthcare provider when you can return to work and other daily activities.      Have someone stay with you for the first 24 hours after your injury. Your healthcare provider should be contacted if your symptoms get worse, or you develop new symptoms.      Do not participate in sports and physical activities until your healthcare provider says it is okay. They could make your symptoms worse or lead to another concussion. Your healthcare provider will tell you when it is okay for you to return to sports or physical activities. Ask for more information about sports concussions.    Prevent another concussion:     Wear protective sports equipment that fits properly. Helmets help decrease your risk for a serious brain injury. Talk to your healthcare provider about ways you can decrease your risk for a concussion if you play sports.      Wear your seatbelt every time you travel. This helps to decrease your risk for a head injury if you are in a car accident.     Follow up with your healthcare provider as directed: Write down your questions so you remember to ask them during your visits.     FOLLOW UP EVALUATION  To ensure optimal concussion recovery, follow up with a doctor specialized in concussion management. An evaluation by a specialty concussion program can ensure timely return to activities.    We offer appointments through the [facility]’s Hotline at [phone].

## 2022-09-24 NOTE — ED PROVIDER NOTE - CLINICAL SUMMARY MEDICAL DECISION MAKING FREE TEXT BOX
Code trauma called prior to arrival of EMS due to the report of the patient having a GCS of 13 after a fall.  Patient found to have GCS of 15 on arrival to the emergency department in the setting of a fall after drinking alcohol so the code trauma was downgraded to a trauma alert.  CT of the head, CT cervical spine, CT face to rule out intracranial hemorrhage and/or fracture.  IV fluids, labs and reassessment.  Plastic surgery to repair right upper lip laceration that crosses the vermilion border.

## 2022-09-24 NOTE — ED PROVIDER NOTE - PATIENT PORTAL LINK FT
You can access the FollowMyHealth Patient Portal offered by [facility] by registering at the following website: [email or web address]. By joining [employer]’s FollowMyHealth portal, you will also be able to view your health information using other applications (apps) compatible with our system.

## 2022-09-24 NOTE — ED PROVIDER NOTE - OBJECTIVE STATEMENT
37-year-old male no pertinent past medical history brought in by EMS status post fall after drinking alcohol with a large right upper lip laceration and somnolence.  EMS initially reported that the patient's GCS was 13 and he was confused so a code trauma was called prior to arrival of EMS.  On arrival of the patient, the patient's was arousable to verbal stimuli and his GCS is 15 so the code trauma was downgraded to a trauma alert.  Patient notes that he is at a hospital and that the year is 2022 and admits to drinking a large volume of whiskey at the wedding party.  Patient denies any symptoms other than a headache.

## 2022-09-24 NOTE — ED PROVIDER NOTE - NSICDXPASTMEDICALHX_GEN_ALL_CORE_FT
PAST MEDICAL HISTORY:  Eosinophilic esophagitis     Esophageal stricture     Exercise-induced asthma     Migraine headache     No pertinent past medical history

## 2022-09-25 VITALS
SYSTOLIC BLOOD PRESSURE: 128 MMHG | RESPIRATION RATE: 18 BRPM | OXYGEN SATURATION: 98 % | DIASTOLIC BLOOD PRESSURE: 74 MMHG | HEART RATE: 84 BPM

## 2022-09-25 LAB
BASOPHILS # BLD AUTO: 0 K/UL — SIGNIFICANT CHANGE UP (ref 0–0.2)
BASOPHILS NFR BLD AUTO: 0 % — SIGNIFICANT CHANGE UP (ref 0–2)
EOSINOPHIL # BLD AUTO: 0.68 K/UL — HIGH (ref 0–0.5)
EOSINOPHIL NFR BLD AUTO: 5 % — SIGNIFICANT CHANGE UP (ref 0–6)
LYMPHOCYTES # BLD AUTO: 32 % — SIGNIFICANT CHANGE UP (ref 13–44)
LYMPHOCYTES # BLD AUTO: 4.33 K/UL — HIGH (ref 1–3.3)
MONOCYTES # BLD AUTO: 0.54 K/UL — SIGNIFICANT CHANGE UP (ref 0–0.9)
MONOCYTES NFR BLD AUTO: 4 % — SIGNIFICANT CHANGE UP (ref 2–14)
NEUTROPHILS # BLD AUTO: 5.41 K/UL — SIGNIFICANT CHANGE UP (ref 1.8–7.4)
NEUTROPHILS NFR BLD AUTO: 40 % — LOW (ref 43–77)
NRBC # BLD: SIGNIFICANT CHANGE UP /100 WBCS (ref 0–0)
SARS-COV-2 RNA SPEC QL NAA+PROBE: SIGNIFICANT CHANGE UP

## 2022-09-25 RX ADMIN — TETANUS TOXOID, REDUCED DIPHTHERIA TOXOID AND ACELLULAR PERTUSSIS VACCINE, ADSORBED 0.5 MILLILITER(S): 5; 2.5; 8; 8; 2.5 SUSPENSION INTRAMUSCULAR at 01:24

## 2022-09-25 NOTE — CONSULT NOTE ADULT - ASSESSMENT
37 year old male s/p right upper lip and brow laceration repair  cold compress and head elevation  bid bacitracin to the lacerations  RTC in a week for suture removal

## 2022-09-25 NOTE — ED PROCEDURE NOTE - PROCEDURE ADDITIONAL DETAILS
Patient had a full thickness laceration in the right upper lip. Laceration was complex and stelate. Local tissue rearrangement had to be performed to correctly align the tissue and allow for the vermilion border to be approximated. The mucosa was closed with 5-0 chromic gut. The muscle was repaired with interrupted 4-0 vicryl sutures. The skin was closed with 5-0 prolene. The vermilion border was well approximated at the end of the procedure. The total length of closure was 5cm. He also had another 1cm laceration on the right brow, which was irrigated and cleaned. The laceration was repaired with interrupted 5-0 prolene sutures.

## 2022-09-25 NOTE — ED ADULT NURSE NOTE - CHIEF COMPLAINT QUOTE
BIBA s/p fall with head injury. Intoxicated. Pt was found on ground by the door in bathroom. Laceration noted on lip.  Code trauma proactivated at 2316. Pt awake alert and oriented x1 upon arrival. GCS 13.

## 2022-09-25 NOTE — CONSULT NOTE ADULT - SUBJECTIVE AND OBJECTIVE BOX
37 year old male who presented to ER for fall. He was found down. He was attending a wedding. He had CT scans which showed a nondisplaced nasal bone fracture. He has right upper lip and right brow laceration. The lip laceration is full thickness and stellate. The laceration is complex as it involved the vermilion border and is stellate. He is aware and follows commands. He is with his spouse and we went over the laceration repair. Risks and benefits as well as expectations were explained to the patient and family. He is aware and ready to proceed.     Physical  EOMI, no step offs felt on the maxilla and periorbital area, occlusion appears intact, no ecchymosis in the periorbital region, full thickness right upper lip laceration, 1cm laceration on the right brow, not bleeding and is clean    Procedure Note:  Informed consent was obtained from family. The right brow and right upper lip was infiltrated with lidocaine with epinephrine. The right brow was then irrigated with copious amounts of normal saline. The laceration was then approximated with 5-0 prolene. We then turned our attention to the right upper lip. The vermilion border was marked. Lidocaine with epinephrine was then infiltrated. The wound was irrigated. The orbicularis was then repaired with 4-0 vicryl. The vermilion was then approximated with 5-0 prolene sutures. The skin was closed with 5-0 prolene sutures. The mucosa was then closed with 5-0 chromic gut sutures. The laceration is complex as it is stellate and there are three subunits. Local tissue rearrangement of the right upper lip was performed. The total length of closure was 5cm. He tolerated the procedure well. The wound was then dressed with bacitracin.

## 2022-11-29 ENCOUNTER — NON-APPOINTMENT (OUTPATIENT)
Age: 37
End: 2022-11-29

## 2023-02-13 ENCOUNTER — OUTPATIENT (OUTPATIENT)
Dept: OUTPATIENT SERVICES | Facility: HOSPITAL | Age: 38
LOS: 1 days | End: 2023-02-13
Payer: COMMERCIAL

## 2023-02-13 ENCOUNTER — APPOINTMENT (OUTPATIENT)
Dept: ULTRASOUND IMAGING | Facility: CLINIC | Age: 38
End: 2023-02-13
Payer: COMMERCIAL

## 2023-02-13 DIAGNOSIS — Z98.890 OTHER SPECIFIED POSTPROCEDURAL STATES: Chronic | ICD-10-CM

## 2023-02-13 DIAGNOSIS — E04.1 NONTOXIC SINGLE THYROID NODULE: ICD-10-CM

## 2023-02-13 PROCEDURE — 76536 US EXAM OF HEAD AND NECK: CPT | Mod: 26

## 2023-02-13 PROCEDURE — 76536 US EXAM OF HEAD AND NECK: CPT

## 2023-02-16 ENCOUNTER — NON-APPOINTMENT (OUTPATIENT)
Age: 38
End: 2023-02-16

## 2023-03-24 ENCOUNTER — APPOINTMENT (OUTPATIENT)
Dept: OTOLARYNGOLOGY | Facility: CLINIC | Age: 38
End: 2023-03-24
Payer: COMMERCIAL

## 2023-03-24 VITALS
BODY MASS INDEX: 25.06 KG/M2 | DIASTOLIC BLOOD PRESSURE: 79 MMHG | WEIGHT: 185 LBS | SYSTOLIC BLOOD PRESSURE: 130 MMHG | HEART RATE: 68 BPM | HEIGHT: 72 IN

## 2023-03-24 DIAGNOSIS — K11.8 OTHER DISEASES OF SALIVARY GLANDS: ICD-10-CM

## 2023-03-24 DIAGNOSIS — E04.1 NONTOXIC SINGLE THYROID NODULE: ICD-10-CM

## 2023-03-24 PROCEDURE — 99214 OFFICE O/P EST MOD 30 MIN: CPT

## 2023-03-24 RX ORDER — OMEPRAZOLE 40 MG/1
40 CAPSULE, DELAYED RELEASE ORAL
Qty: 60 | Refills: 5 | Status: COMPLETED | COMMUNITY
Start: 2022-02-24 | End: 2023-03-24

## 2023-03-24 NOTE — REASON FOR VISIT
[Subsequent Evaluation] : a subsequent evaluation for [FreeTextEntry2] : R pleomorphic Adenoma and thyroid nodule

## 2023-03-24 NOTE — CONSULT LETTER
[Dear  ___] : Dear  [unfilled], [Courtesy Letter:] : I had the pleasure of seeing your patient, [unfilled], in my office today. [Please see my note below.] : Please see my note below. [Consult Closing:] : Thank you very much for allowing me to participate in the care of this patient.  If you have any questions, please do not hesitate to contact me. [Sincerely,] : Sincerely, [FreeTextEntry2] : Dr Leon De La Paz  [FreeTextEntry3] : \par Domingo Farrell MD, FACS\par \par Otolaryngology-Head and Neck Surgery\par [employer] [facility]\par

## 2023-03-24 NOTE — HISTORY OF PRESENT ILLNESS
[de-identified] : 38 year old male presents for follow up for R pleomorphic adenoma and thyroid nodule. History Parotidectomy on 7/25/20. Margins negative.\par States doing well, still has slight right neck tightness at times. Pt has dysphagia, due to his eosinophilic esophagitis. States saw Dr Brunner 2/24/22, was placed on Omeprazole, discussed doing a second upper endoscopy with dilation of the esophageal narrowing, will call for follow up. States has not take omeprazole for over a year. Weight has been stable. Denies odynophagia, dyspnea, dysphonia, night sweats, chills, fevers, or otalgia. \par  \par \par \par \par US Thyroid 2/13/23\par IMPRESSION:\par Uninodular RIGHT gland: 9 mm TR 4 RIGHT thyroid nodule, not significantly changed\par \par TI-RAD 4: Moderately suspicious (FNA if > 1.5 cm, Follow if > 1 cm)

## 2023-03-24 NOTE — PHYSICAL EXAM
[de-identified] : Incision well healed. ARLETTE. [Midline] : trachea located in midline position [Normal] : no rashes

## 2023-10-16 NOTE — ED ADULT TRIAGE NOTE - BMI (KG/M2)
27.1 Chief Complaint   Patient presents with    Well Child     12 yo     Here with dad for annual well child. He is in the 11th grade at [employer]. He does not play sports. 1. Have you been to the ER, urgent care clinic since your last visit? Hospitalized since your last visit? No    2. Have you seen or consulted any other health care providers outside of the [address] [place] [address] Avenue since your last visit? Include any pap smears or colon screening.  No

## 2024-01-18 NOTE — PHYSICAL EXAM
[de-identified] : Wound clean and intact without any swelling, tenderness, erythema or discharge.  mild swelling at the right ear lobe, no cellulitis.  [Negative] : Heme/Lymph

## 2024-02-28 NOTE — ED ADULT TRIAGE NOTE - ADDITIONAL SAFETY/BANDS...
Prescription Strength Graduated Compression Stockings Recommendations: --------------------------------\\nThe patient was counseled that prescription strength graduated compression stockings should be worn during waking hours to help eliminate venous congestion and aide in prevention of thrombosis.\\nPatient will follow up after their trial period of graduated compression stocking usage to re-evaluate their symptoms. Compression Stockings Recommendations: Compression treatment acts to: Eliminate venous congestion by accelerating venous return. Decongest surrounding tissues and transport accumulated metabolic waste and interstitial fluid away\\nCompression therapy aids to prevent: Thrombosis formation and subsequent pulmonary embolism because a thrombus is prevented from being dislodged by compression pressure and can even regress or dissolve completely. Detail Level: Zone Additional Safety/Bands:

## 2024-04-01 ENCOUNTER — NON-APPOINTMENT (OUTPATIENT)
Age: 39
End: 2024-04-01

## 2024-04-29 NOTE — ED PROVIDER NOTE - CARE PROVIDER_API CALL
Universal Protocol:  Consent: Verbal consent obtained.  Risks and benefits: risks, benefits and alternatives were discussed  Consent given by: patient  Supporting Documentation  Indications: pain (Myofascial pain syndrome)   Trigger Point Injections: single/multiple trigger point(s): 1-2 muscle groups    Injection site identified by: palpation  Procedure Details  Location(s):    Neck/Upper Back: L occipital ridge and R occipital ridge     Prep: patient was prepped and draped in usual sterile fashion  Needle size: 27 G  Patient tolerance: patient tolerated the procedure well with no immediate complications  Additional procedure details: Using aseptic technique I performed a total 2 trigger point injections. Sarapin 4 ML and Lidocaine 1% 2 ML       Brandy was seen today for neck pain.    Diagnoses and all orders for this visit:    Myofascial pain syndrome  -     Trigger Point Injection  -     sarapin injection 4 mL  -     methocarbamol (ROBAXIN) 500 mg tablet; Take 1 tablet (500 mg total) by mouth 4 (four) times a day as needed for muscle spasms    Recurrent posterior neck pain.  No radicular symptoms.  No arm weakness or numbness. No trauma or injury     Trigger point tenderness suboccipital areas bilaterally.  Movement pain with left/right lateral rotation    
Arlene Harrison)  Plastic Surgery; Surgery  [address] [facility] [address]  [place] [address]  Phone: [phone]  Fax: [phone]  Follow Up Time: 7-10 Days

## 2024-12-26 ENCOUNTER — NON-APPOINTMENT (OUTPATIENT)
Age: 39
End: 2024-12-26

## 2025-02-14 ENCOUNTER — NON-APPOINTMENT (OUTPATIENT)
Age: 40
End: 2025-02-14